# Patient Record
Sex: MALE
[De-identification: names, ages, dates, MRNs, and addresses within clinical notes are randomized per-mention and may not be internally consistent; named-entity substitution may affect disease eponyms.]

---

## 2022-01-21 ENCOUNTER — NURSE TRIAGE (OUTPATIENT)
Dept: OTHER | Facility: CLINIC | Age: 57
End: 2022-01-21

## 2023-08-20 PROBLEM — N48.6 PEYRONIE'S DISEASE: Status: ACTIVE | Noted: 2023-08-20

## 2023-08-20 PROBLEM — Z91.018 MULTIPLE FOOD ALLERGIES: Status: ACTIVE | Noted: 2023-08-20

## 2023-08-20 PROBLEM — R11.0 NAUSEA: Status: ACTIVE | Noted: 2023-08-20

## 2023-08-20 PROBLEM — I77.79 CELIAC ARTERY DISSECTION (MULTI): Status: ACTIVE | Noted: 2023-08-20

## 2023-08-20 PROBLEM — T78.40XA ALLERGIC REACTION: Status: ACTIVE | Noted: 2023-08-20

## 2023-08-20 PROBLEM — J11.1 INFLUENZA: Status: ACTIVE | Noted: 2023-08-20

## 2023-08-20 PROBLEM — Z87.892 HX OF ANAPHYLAXIS: Status: ACTIVE | Noted: 2023-08-20

## 2023-08-20 PROBLEM — N52.9 ERECTILE DYSFUNCTION: Status: ACTIVE | Noted: 2023-08-20

## 2023-08-20 PROBLEM — R09.81 SINUS CONGESTION: Status: ACTIVE | Noted: 2023-08-20

## 2023-08-20 RX ORDER — ASPIRIN 81 MG/1
1 TABLET ORAL DAILY
COMMUNITY
Start: 2022-01-12

## 2023-08-20 RX ORDER — TADALAFIL 5 MG/1
5 TABLET ORAL DAILY
COMMUNITY
End: 2024-05-02 | Stop reason: SDUPTHER

## 2023-08-20 RX ORDER — EPINEPHRINE 0.3 MG/.3ML
0.3 INJECTION INTRAMUSCULAR
COMMUNITY
Start: 2017-06-10

## 2023-08-20 RX ORDER — ONDANSETRON 8 MG/1
8 TABLET, ORALLY DISINTEGRATING ORAL
COMMUNITY
Start: 2018-04-18

## 2023-08-20 RX ORDER — OSELTAMIVIR PHOSPHATE 75 MG/1
75 CAPSULE ORAL 2 TIMES DAILY
COMMUNITY
Start: 2018-04-17

## 2023-08-20 RX ORDER — ALBUTEROL SULFATE 90 UG/1
1-2 AEROSOL, METERED RESPIRATORY (INHALATION)
COMMUNITY
Start: 2018-04-17

## 2023-08-20 RX ORDER — FLUTICASONE PROPIONATE 50 MCG
1-2 SPRAY, SUSPENSION (ML) NASAL
COMMUNITY
Start: 2019-02-25

## 2023-08-20 RX ORDER — CLOPIDOGREL BISULFATE 75 MG/1
1 TABLET ORAL DAILY
COMMUNITY
Start: 2022-01-12

## 2023-08-20 RX ORDER — BENZONATATE 200 MG/1
200 CAPSULE ORAL
COMMUNITY
Start: 2018-04-17

## 2023-08-20 RX ORDER — PENTOXIFYLLINE 400 MG/1
400 TABLET, EXTENDED RELEASE ORAL
COMMUNITY

## 2023-09-21 ENCOUNTER — TELEPHONE (OUTPATIENT)
Dept: UROLOGY | Facility: HOSPITAL | Age: 58
End: 2023-09-21
Payer: COMMERCIAL

## 2023-09-21 NOTE — TELEPHONE ENCOUNTER
Talked to pt, said he will call back to schedule. Canceling his 10/2 appt with Dr. Mendez in the meantime.

## 2023-10-02 ENCOUNTER — APPOINTMENT (OUTPATIENT)
Dept: UROLOGY | Facility: HOSPITAL | Age: 58
End: 2023-10-02
Payer: COMMERCIAL

## 2024-05-02 ENCOUNTER — OFFICE VISIT (OUTPATIENT)
Dept: UROLOGY | Facility: HOSPITAL | Age: 59
End: 2024-05-02
Payer: COMMERCIAL

## 2024-05-02 VITALS — HEIGHT: 73 IN | BODY MASS INDEX: 26.51 KG/M2 | WEIGHT: 200 LBS

## 2024-05-02 DIAGNOSIS — N52.9 ERECTILE DYSFUNCTION, UNSPECIFIED ERECTILE DYSFUNCTION TYPE: ICD-10-CM

## 2024-05-02 DIAGNOSIS — N48.6 PEYRONIE'S DISEASE: Primary | ICD-10-CM

## 2024-05-02 PROCEDURE — 99214 OFFICE O/P EST MOD 30 MIN: CPT | Performed by: UROLOGY

## 2024-05-02 PROCEDURE — 1036F TOBACCO NON-USER: CPT | Performed by: UROLOGY

## 2024-05-02 RX ORDER — OMEPRAZOLE 20 MG/1
20 CAPSULE, DELAYED RELEASE ORAL DAILY
COMMUNITY

## 2024-05-02 RX ORDER — FLUTICASONE PROPIONATE AND SALMETEROL 250; 50 UG/1; UG/1
1 POWDER RESPIRATORY (INHALATION) 2 TIMES DAILY
COMMUNITY
Start: 2024-04-09 | End: 2024-07-08

## 2024-05-02 RX ORDER — TADALAFIL 5 MG/1
5 TABLET ORAL DAILY
Qty: 30 TABLET | Refills: 11 | Status: SHIPPED | OUTPATIENT
Start: 2024-05-02 | End: 2025-05-02

## 2024-05-02 NOTE — PROGRESS NOTES
"FUV    Last seen - 7/12/23     HISTORY OF PRESENT ILLNESS:   Miguel Valverde is a 58 y.o. male who is being seen today for fuv.      H/o penile injury. Did not seak immediate care. Now with right lateral curve and narrowing. Still able to get erections and have intercourse.     7/12/23 - Started on cialis and Trental    5/2/24 - Still having issues with firmness and curvature.  40 right lateral curve.  Erections ok. Partner more bothered then he is    PAST MEDICAL HISTORY:  No past medical history on file.    PAST SURGICAL HISTORY:  No past surgical history on file.     ALLERGIES:   Allergies   Allergen Reactions    Amoxicillin Nausea Only, Unknown and Other     Nausea    Penicillins Other     as a child and in his early 20's        MEDICATIONS:   Current Outpatient Medications   Medication Instructions    albuterol (Ventolin HFA) 90 mcg/actuation inhaler 1-2 puffs, inhalation, EVERY 4 TO 6 HOURS AS NEEDED.    aspirin 81 mg EC tablet 1 tablet, oral, Daily    benzonatate (TESSALON) 200 mg, oral, 2-3 TIMES DAILY.    clopidogrel (Plavix) 75 mg tablet 1 tablet, oral, Daily    EpiPen 2-Paco 0.3 mg, injection, As Directed    fluticasone (Flonase) 50 mcg/actuation nasal spray 1-2 sprays, Each Nostril, Daily RT    fluticasone propion-salmeteroL (Advair Diskus) 250-50 mcg/dose diskus inhaler 1 puff, inhalation, 2 times daily    omeprazole (PRILOSEC) 20 mg, oral, Daily    ondansetron ODT (ZOFRAN-ODT) 8 mg, oral, DISSOLVE 1 TABLET ON TONGUE 30MIN-1HR PRIOR TO EACH ANTIBIOTIC DOSE    oseltamivir (TAMIFLU) 75 mg, oral, 2 times daily    pentoxifylline (TRENTAL) 400 mg, oral, 3 times daily with meals, Do not crush, chew, or split.    tadalafil (CIALIS) 5 mg, oral, Daily        PHYSICAL EXAM:  Height 1.854 m (6' 1\"), weight 90.7 kg (200 lb).  Constitutional: Patient appears well-developed and well-nourished. No distress.    Pulmonary/Chest: Effort normal. No respiratory distress.   Abdominal: Soft, ND NT  : circumcised phallus, " dorsal plaque  Musculoskeletal: Normal range of motion.    Neurological: Alert and oriented to person, place, and time.  Psychiatric: Normal mood and affect. Behavior is normal. Thought content normal.      Labs  Lab Results   Component Value Date    GFRMALE 71 (A) 01/11/2022     Lab Results   Component Value Date    CREATININE 1.18 01/11/2022     Lab Results   Component Value Date    CHOL 228 (H) 01/21/2021     Lab Results   Component Value Date    HDL 37.2 (A) 01/21/2021     Lab Results   Component Value Date    CHHDL 6.1 (A) 01/21/2021     Lab Results   Component Value Date    LDLF 159 (H) 01/21/2021     Lab Results   Component Value Date    VLDL 31 01/21/2021     Lab Results   Component Value Date    TRIG 157 (H) 01/21/2021     Lab Results   Component Value Date    HCT 43.2 01/11/2022       Imaging    Procedures      Assessment:      1. Peyronie's disease        2. Erectile dysfunction, unspecified erectile dysfunction type  tadalafil (Cialis) 5 mg tablet        Miguel Valverde is a 58 y.o. male here for FUV     Plan:   1)  Will start daily cialis  2) discussed surgical options and xiaflex     FU In , sooner if needed

## 2024-08-22 ENCOUNTER — OFFICE VISIT (OUTPATIENT)
Dept: UROLOGY | Facility: HOSPITAL | Age: 59
End: 2024-08-22
Payer: COMMERCIAL

## 2024-08-22 DIAGNOSIS — N52.9 ERECTILE DYSFUNCTION, UNSPECIFIED ERECTILE DYSFUNCTION TYPE: Primary | ICD-10-CM

## 2024-08-22 DIAGNOSIS — N48.6 PEYRONIE'S DISEASE: ICD-10-CM

## 2024-08-22 PROCEDURE — 99214 OFFICE O/P EST MOD 30 MIN: CPT | Performed by: UROLOGY

## 2024-08-22 RX ORDER — TADALAFIL 20 MG/1
20 TABLET ORAL DAILY PRN
Qty: 12 TABLET | Refills: 3 | Status: SHIPPED | OUTPATIENT
Start: 2024-08-22 | End: 2024-09-21

## 2024-08-22 RX ORDER — TADALAFIL 5 MG/1
5 TABLET ORAL DAILY
Qty: 30 TABLET | Refills: 11 | Status: SHIPPED | OUTPATIENT
Start: 2024-08-22 | End: 2025-08-22

## 2024-08-22 NOTE — PROGRESS NOTES
FUV    Last seen - 7/12/23     HISTORY OF PRESENT ILLNESS:   Miguel Valverde is a 58 y.o. male who is being seen today for fuv.      H/o penile injury. Did not seak immediate care. Now with right lateral curve and narrowing. Still able to get erections and have intercourse.     [place]     7/12/23 - Started on cialis and Trental    5/2/24 - Still having issues with firmness and curvature.  40 right lateral curve.  Erections ok. Partner more bothered then he is.  Started on daily cialis    8/22/24 - No real changes, maybe has re-injured.  Able to have intercourse, but not as good.  Distal part of shaft not getting as hard.      PAST MEDICAL HISTORY:  No past medical history on file.    PAST SURGICAL HISTORY:  No past surgical history on file.     ALLERGIES:   Allergies   Allergen Reactions    Amoxicillin Nausea Only, Unknown and Other     Nausea    Penicillins Other     as a child and in his early 20's        MEDICATIONS:   Current Outpatient Medications   Medication Instructions    albuterol (Ventolin HFA) 90 mcg/actuation inhaler 1-2 puffs, inhalation, EVERY 4 TO 6 HOURS AS NEEDED.    aspirin 81 mg EC tablet 1 tablet, oral, Daily    benzonatate (TESSALON) 200 mg, oral, 2-3 TIMES DAILY.    clopidogrel (Plavix) 75 mg tablet 1 tablet, oral, Daily    EpiPen 2-Paco 0.3 mg, injection, As Directed    fluticasone (Flonase) 50 mcg/actuation nasal spray 1-2 sprays, Each Nostril, Daily RT    fluticasone propion-salmeteroL (Advair Diskus) 250-50 mcg/dose diskus inhaler 1 puff, inhalation, 2 times daily    omeprazole (PRILOSEC) 20 mg, oral, Daily    ondansetron ODT (ZOFRAN-ODT) 8 mg, oral, DISSOLVE 1 TABLET ON TONGUE 30MIN-1HR PRIOR TO EACH ANTIBIOTIC DOSE    oseltamivir (TAMIFLU) 75 mg, oral, 2 times daily    pentoxifylline (TRENTAL) 400 mg, oral, 3 times daily (morning, midday, late afternoon), Do not crush, chew, or split.    tadalafil (CIALIS) 5 mg, oral, Daily        PHYSICAL EXAM:  There were no vitals taken  for this visit.  Constitutional: Patient appears well-developed and well-nourished. No distress.    Pulmonary/Chest: Effort normal. No respiratory distress.   Musculoskeletal: Normal range of motion.    Neurological: Alert and oriented to person, place, and time.  Psychiatric: Normal mood and affect. Behavior is normal. Thought content normal.      Labs  Lab Results   Component Value Date    GFRMALE 71 (A) 01/11/2022     Lab Results   Component Value Date    CREATININE 1.18 01/11/2022     Lab Results   Component Value Date    CHOL 228 (H) 01/21/2021     Lab Results   Component Value Date    HDL 37.2 (A) 01/21/2021     Lab Results   Component Value Date    CHHDL 6.1 (A) 01/21/2021     Lab Results   Component Value Date    LDLF 159 (H) 01/21/2021     Lab Results   Component Value Date    VLDL 31 01/21/2021     Lab Results   Component Value Date    TRIG 157 (H) 01/21/2021     Lab Results   Component Value Date    HCT 43.2 01/11/2022       Imaging    Procedures      Assessment:      1. Erectile dysfunction, unspecified erectile dysfunction type  tadalafil (Cialis) 20 mg tablet    tadalafil (Cialis) 5 mg tablet      2. Peyronie's disease            Miguel Valverde is a 58 y.o. male here for FUV     Plan:   1)  Cont daily cialis, can also take additional PRN  2) discussed surgical options and xiaflex. Not interested in doppler    FU In , sooner if needed

## 2024-12-05 ENCOUNTER — APPOINTMENT (OUTPATIENT)
Dept: UROLOGY | Facility: HOSPITAL | Age: 59
End: 2024-12-05
Payer: COMMERCIAL

## 2024-12-05 DIAGNOSIS — N48.6 PEYRONIE'S DISEASE: Primary | ICD-10-CM

## 2024-12-05 DIAGNOSIS — N52.9 ERECTILE DYSFUNCTION, UNSPECIFIED ERECTILE DYSFUNCTION TYPE: ICD-10-CM

## 2024-12-05 PROCEDURE — 99214 OFFICE O/P EST MOD 30 MIN: CPT | Performed by: UROLOGY

## 2024-12-05 RX ORDER — TADALAFIL 20 MG/1
20 TABLET ORAL DAILY PRN
Qty: 12 TABLET | Refills: 6 | Status: SHIPPED | OUTPATIENT
Start: 2024-12-05 | End: 2025-12-05

## 2024-12-05 RX ORDER — TADALAFIL 5 MG/1
5 TABLET ORAL DAILY
Qty: 90 TABLET | Refills: 3 | Status: SHIPPED | OUTPATIENT
Start: 2024-12-05 | End: 2025-12-05

## 2024-12-05 NOTE — PROGRESS NOTES
FUV    Virtual or Telephone Consent    An interactive audio and video telecommunication system which permits real time communications between the patient (at the originating site) and provider (at the distant site) was utilized to provide this telehealth service.   Verbal consent was requested and obtained from Miguel Valverde on this date, 12/05/24 for a telehealth visit.      Last seen - 8/22/24     HISTORY OF PRESENT ILLNESS:   Miguel Valverde is a 59 y.o. male who is being seen today for fuv.      H/o penile injury. Did not seak immediate care. Now with right lateral curve and narrowing. Still able to get erections and have intercourse.     [place]     7/12/23 - Started on cialis and Trental    5/2/24 - Still having issues with firmness and curvature.  40 right lateral curve.  Erections ok. Partner more bothered then he is.  Started on daily cialis    8/22/24 - No real changes, maybe has re-injured.  Able to have intercourse, but not as good.  Distal part of shaft not getting as hard.      12/5/24 - Doing well, no real changes.  On daily cialis and additional PRN    PAST MEDICAL HISTORY:  No past medical history on file.    PAST SURGICAL HISTORY:  No past surgical history on file.     ALLERGIES:   Allergies   Allergen Reactions    Amoxicillin Nausea Only, Unknown and Other     Nausea    Penicillins Other     as a child and in his early 20's        MEDICATIONS:   Current Outpatient Medications   Medication Instructions    albuterol (Ventolin HFA) 90 mcg/actuation inhaler 1-2 puffs, inhalation, EVERY 4 TO 6 HOURS AS NEEDED.    aspirin 81 mg EC tablet 1 tablet, oral, Daily    benzonatate (TESSALON) 200 mg, oral, 2-3 TIMES DAILY.    clopidogrel (Plavix) 75 mg tablet 1 tablet, oral, Daily    EpiPen 2-Paco 0.3 mg, injection, As Directed    fluticasone (Flonase) 50 mcg/actuation nasal spray 1-2 sprays, Each Nostril, Daily RT    fluticasone propion-salmeteroL (Advair Diskus) 250-50 mcg/dose diskus inhaler 1  puff, inhalation, 2 times daily    omeprazole (PRILOSEC) 20 mg, oral, Daily    ondansetron ODT (ZOFRAN-ODT) 8 mg, oral, DISSOLVE 1 TABLET ON TONGUE 30MIN-1HR PRIOR TO EACH ANTIBIOTIC DOSE    oseltamivir (TAMIFLU) 75 mg, oral, 2 times daily    pentoxifylline (TRENTAL) 400 mg, oral, 3 times daily (morning, midday, late afternoon), Do not crush, chew, or split.    tadalafil (CIALIS) 20 mg, oral, Daily PRN    tadalafil (CIALIS) 5 mg, oral, Daily        PHYSICAL EXAM:  There were no vitals taken for this visit.  Constitutional: Patient appears well-developed and well-nourished. No distress.    Pulmonary/Chest: Effort normal. No respiratory distress.   Musculoskeletal: Normal range of motion.    Neurological: Alert and oriented to person, place, and time.  Psychiatric: Normal mood and affect. Behavior is normal. Thought content normal.      Labs  Lab Results   Component Value Date    GFRMALE 71 (A) 01/11/2022     Lab Results   Component Value Date    CREATININE 1.18 01/11/2022     Lab Results   Component Value Date    CHOL 228 (H) 01/21/2021     Lab Results   Component Value Date    HDL 37.2 (A) 01/21/2021     Lab Results   Component Value Date    CHHDL 6.1 (A) 01/21/2021     Lab Results   Component Value Date    LDLF 159 (H) 01/21/2021     Lab Results   Component Value Date    VLDL 31 01/21/2021     Lab Results   Component Value Date    TRIG 157 (H) 01/21/2021     Lab Results   Component Value Date    HCT 43.2 01/11/2022       Imaging    Procedures      Assessment:      1. Peyronie's disease        2. Erectile dysfunction, unspecified erectile dysfunction type            Miguel Valverde is a 59 y.o. male here for FUV     Plan:   1)  Cont daily cialis, can also take additional PRN  2) discussed surgical options and xiaflex. Not interested in doppler    FU In 12m, sooner if needed    PDE-5 inhibitor  The patient has been diagnosed with erectile dysfunction and cardiac assessment according to the [place] criteria allows  use of oral PDE-5 inhibitor.  The patient understands that these medications are not initiators of erection and that he will still require sexual stimulation.  If prescribed vardenafil or sildenafil, he was advised to take the medication 30-60 minutes prior to sexual activity and that the efficacy of the medication is decreased following a high-fat meal.  The patient verbalized understanding that nitrates such as nitroglycerine, isosorbide mononitrate, isosorbide dinitrate and any other nitrate preparations are absolutely contraindicated with the use of a PDE-5 inhibitor. The patient was advised to alert any medical personal of PDE-5 inhibitor use should he seek urgent medical attention for any reason.  I explained the common adverse effects of therapy including, but not limited to headache, flushing, dizziness, rash, upset stomach, diarrhea, nasal congestion, abnormal vision, back pain, and myalgia.  Less common side effects are lightheadedness or blood pressure drop upon standing.   We discussed that patients have  after using medications in this class, and sometimes the exact cause of death was not able to be determined.  There is a very small risk of non-arteritic ischemic optic neuropathy, a rare cause of blindness that may be permanent while using medications in this class.  Patient is instructed to immediately stop this medication and seek medical attention if he develops visual disturbances in one or both eyes.  We discussed that if he experiences erection lasting longer than four hours, he needs to seek immediate treatment at the emergency department as the longer he waits, the more damage that is done to the penile tissue.    The patient states that he is not withholding any information about his condition or the use of medications in order to receive a PDE-5 inhibitor class medication.  No barriers to learning were identified.  After all of the patient's questions were satisfactorily answered, he  expressed understanding of the risks of therapy and wishes to proceed.

## 2024-12-09 DIAGNOSIS — N52.9 ERECTILE DYSFUNCTION, UNSPECIFIED ERECTILE DYSFUNCTION TYPE: ICD-10-CM

## 2024-12-09 RX ORDER — TADALAFIL 20 MG/1
TABLET, FILM COATED ORAL
Qty: 12 TABLET | Refills: 6 | Status: SHIPPED | OUTPATIENT
Start: 2024-12-09

## 2024-12-09 RX ORDER — TADALAFIL 5 MG/1
5 TABLET, FILM COATED ORAL DAILY
Qty: 90 TABLET | Refills: 3 | Status: SHIPPED | OUTPATIENT
Start: 2024-12-09

## 2024-12-18 RX ORDER — TADALAFIL 5 MG/1
5 TABLET, FILM COATED ORAL DAILY
Qty: 90 TABLET | Refills: 3 | Status: SHIPPED | OUTPATIENT
Start: 2024-12-18